# Patient Record
Sex: FEMALE | Race: BLACK OR AFRICAN AMERICAN | NOT HISPANIC OR LATINO | Employment: FULL TIME | URBAN - METROPOLITAN AREA
[De-identification: names, ages, dates, MRNs, and addresses within clinical notes are randomized per-mention and may not be internally consistent; named-entity substitution may affect disease eponyms.]

---

## 2020-08-19 ENCOUNTER — OFFICE VISIT (OUTPATIENT)
Dept: FAMILY MEDICINE CLINIC | Facility: CLINIC | Age: 30
End: 2020-08-19
Payer: COMMERCIAL

## 2020-08-19 VITALS
TEMPERATURE: 98.3 F | DIASTOLIC BLOOD PRESSURE: 70 MMHG | BODY MASS INDEX: 19.25 KG/M2 | WEIGHT: 127 LBS | HEIGHT: 68 IN | HEART RATE: 72 BPM | OXYGEN SATURATION: 98 % | SYSTOLIC BLOOD PRESSURE: 116 MMHG

## 2020-08-19 DIAGNOSIS — H53.9 VISUAL DISTURBANCE: ICD-10-CM

## 2020-08-19 DIAGNOSIS — R27.0 ATAXIA: ICD-10-CM

## 2020-08-19 DIAGNOSIS — R51.9 WORSENING HEADACHES: ICD-10-CM

## 2020-08-19 DIAGNOSIS — M54.2 CERVICALGIA: Primary | ICD-10-CM

## 2020-08-19 PROCEDURE — 1036F TOBACCO NON-USER: CPT | Performed by: FAMILY MEDICINE

## 2020-08-19 PROCEDURE — 3008F BODY MASS INDEX DOCD: CPT | Performed by: FAMILY MEDICINE

## 2020-08-19 PROCEDURE — 3725F SCREEN DEPRESSION PERFORMED: CPT | Performed by: FAMILY MEDICINE

## 2020-08-19 PROCEDURE — 99204 OFFICE O/P NEW MOD 45 MIN: CPT | Performed by: FAMILY MEDICINE

## 2020-08-19 RX ORDER — MULTIVITAMIN
1 TABLET ORAL DAILY
COMMUNITY

## 2020-08-19 RX ORDER — NORETHINDRONE ACETATE AND ETHINYL ESTRADIOL 1MG-20(21)
KIT ORAL
COMMUNITY
Start: 2020-03-24 | End: 2020-08-19 | Stop reason: ALTCHOICE

## 2020-08-19 RX ORDER — ACETAMINOPHEN, ASPIRIN AND CAFFEINE 250; 250; 65 MG/1; MG/1; MG/1
1 TABLET, FILM COATED ORAL EVERY 6 HOURS PRN
COMMUNITY

## 2020-08-19 RX ORDER — NAPROXEN 500 MG/1
500 TABLET ORAL 2 TIMES DAILY WITH MEALS
Qty: 60 TABLET | Refills: 1 | Status: SHIPPED | OUTPATIENT
Start: 2020-08-19

## 2020-08-19 RX ORDER — SUMATRIPTAN 25 MG/1
25 TABLET, FILM COATED ORAL ONCE AS NEEDED
Qty: 9 TABLET | Refills: 0 | Status: SHIPPED | OUTPATIENT
Start: 2020-08-19 | End: 2020-09-03

## 2020-08-20 NOTE — PROGRESS NOTES
Assessment/Plan:    68-year-old woman with: cervicalgia and worsening headaches along with visual changes and ataxia  Discussed workup and treatment options with risks and benefits  Discussed heat and cold stretching and anti-inflammatories will refer to physical therapy check MRI of the brain along with labs  Encouraged keeping a headache diary as well and vitamin B2 and magnesium 400 mg daily for prophylaxis  Follow-up in 2-3 weeks to discuss test results  No problem-specific Assessment & Plan notes found for this encounter  Diagnoses and all orders for this visit:    Cervicalgia  -     naproxen (NAPROSYN) 500 mg tablet; Take 1 tablet (500 mg total) by mouth 2 (two) times a day with meals  -     Ambulatory referral to Physical Therapy; Future  -     XR spine cervical 2 or 3 vw injury; Future  -     MRI brain w wo contrast; Future  -     CBC and differential; Future  -     Comprehensive metabolic panel; Future  -     TSH, 3rd generation with Free T4 reflex; Future  -     C-reactive protein; Future  -     SARAH Screen w/ Reflex to Titer/Pattern; Future  -     Prolactin; Future  -     Lyme Antibody Profile with reflex to WB; Future    Worsening headaches  -     MRI brain w wo contrast; Future  -     CBC and differential; Future  -     Comprehensive metabolic panel; Future  -     TSH, 3rd generation with Free T4 reflex; Future  -     C-reactive protein; Future  -     SARAH Screen w/ Reflex to Titer/Pattern; Future  -     Prolactin; Future  -     Lyme Antibody Profile with reflex to WB; Future  -     SUMAtriptan (IMITREX) 25 mg tablet; Take 1 tablet (25 mg total) by mouth once as needed for migraine for up to 1 dose    Visual disturbance  -     MRI brain w wo contrast; Future  -     CBC and differential; Future  -     Comprehensive metabolic panel; Future  -     TSH, 3rd generation with Free T4 reflex; Future  -     C-reactive protein; Future  -     SARAH Screen w/ Reflex to Titer/Pattern;  Future  - Prolactin; Future  -     Lyme Antibody Profile with reflex to WB; Future    Ataxia  -     MRI brain w wo contrast; Future  -     CBC and differential; Future  -     Comprehensive metabolic panel; Future  -     TSH, 3rd generation with Free T4 reflex; Future  -     C-reactive protein; Future  -     SARAH Screen w/ Reflex to Titer/Pattern; Future  -     Prolactin; Future  -     Lyme Antibody Profile with reflex to WB; Future    Other orders  -     Discontinue: norethindrone-ethinyl estradiol ([place] 1/20) 1-20 MG-MCG per tablet; take 1 tablet by mouth once daily  -     Multiple Vitamin (multivitamin) tablet; Take 1 tablet by mouth daily  -     aspirin-acetaminophen-caffeine (EXCEDRIN MIGRAINE) 530-013-46 MG per tablet; Take 1 tablet by mouth every 6 (six) hours as needed for headaches          Subjective:     Chief Complaint   Patient presents with   [address] patient here to establish care    Headache - Recurrent or Known Dx Migraines     Patient has had recurring headaches that have worsened in intensity after hx of a few falls off horse and being hit in face by horse and dog  Patient blacked out once in August from one of these falls  Patient has nausea and vomiting associated with headaches  Patient has never been evaluated for possible concussion  Patient has experienced balance issues after fall   Neck Pain     Patient has had left-sided neck pain since fall off horse in early August  Pain level is 8  Patient takes Excedrin with little help  Patient ID: Fannie Laureano is a 30 y o  female  Patient is a 17-year-old woman who presents to establish care in this practice  She admits cervicalgia and worsening headaches along with visual changes and ataxia he admits that she 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 no fevers chills nausea vomiting  Tolerating p o  Intake    No other complaints at this time      The following portions of the patient's history were reviewed and updated as appropriate: allergies, current medications, past family history, past medical history, past social history, past surgical history and problem list     Review of Systems   Constitutional: Negative  HENT: Negative  Eyes: Negative  Respiratory: Negative  Cardiovascular: Negative  Gastrointestinal: Negative  Endocrine: Negative  Genitourinary: Negative  Musculoskeletal: Positive for neck pain  Allergic/Immunologic: Negative  Neurological: Positive for headaches  Hematological: Negative  Psychiatric/Behavioral: Negative  All other systems reviewed and are negative  Objective:      /70 (BP Location: Right arm, Patient Position: Sitting, Cuff Size: Adult)   Pulse 72   Temp 98 3 °F (36 8 °C) (Tympanic)   Ht 5' 7 5" (1 715 m)   Wt 57 6 kg (127 lb)   LMP 07/19/2020 (Approximate)   SpO2 98%   BMI 19 60 kg/m²          Physical Exam  Constitutional:       Appearance: She is well-developed  HENT:      Head: Atraumatic  Right Ear: External ear normal       Left Ear: External ear normal    Eyes:      Conjunctiva/sclera: Conjunctivae normal       Pupils: Pupils are equal, round, and reactive to light  Neck:      Musculoskeletal: Normal range of motion  Cardiovascular:      Rate and Rhythm: Normal rate and regular rhythm  Heart sounds: Normal heart sounds  Pulmonary:      Effort: Pulmonary effort is normal  No respiratory distress  Breath sounds: Normal breath sounds  Abdominal:      General: Bowel sounds are normal  There is no distension  Palpations: Abdomen is soft  Tenderness: There is no abdominal tenderness  There is no guarding or rebound  Musculoskeletal: Normal range of motion  Skin:     General: Skin is warm and dry  Neurological:      Mental Status: She is alert and oriented to person, place, and time  Cranial Nerves: No cranial nerve deficit        Gait: Gait abnormal  Psychiatric:         Behavior: Behavior normal          Thought Content:  Thought content normal          Judgment: Judgment normal

## 2020-08-21 ENCOUNTER — HOSPITAL ENCOUNTER (OUTPATIENT)
Dept: MRI IMAGING | Facility: HOSPITAL | Age: 30
Discharge: HOME | End: 2020-08-21
Payer: COMMERCIAL

## 2020-08-21 DIAGNOSIS — R51.9 WORSENING HEADACHES: ICD-10-CM

## 2020-08-21 DIAGNOSIS — M54.2 CERVICALGIA: ICD-10-CM

## 2020-08-21 DIAGNOSIS — R27.0 ATAXIA: ICD-10-CM

## 2020-08-21 DIAGNOSIS — H53.9 VISUAL DISTURBANCE: ICD-10-CM

## 2020-08-21 PROCEDURE — A9585 GADOBUTROL INJECTION: HCPCS | Performed by: FAMILY MEDICINE

## 2020-08-21 PROCEDURE — 70553 MRI BRAIN STEM W/O & W/DYE: CPT

## 2020-08-21 PROCEDURE — G1004 CDSM NDSC: HCPCS

## 2020-08-21 RX ADMIN — GADOBUTROL 5 ML: 604.72 INJECTION INTRAVENOUS at 15:55

## 2020-08-26 ENCOUNTER — EVALUATION (OUTPATIENT)
Dept: PHYSICAL THERAPY | Facility: CLINIC | Age: 30
End: 2020-08-26
Payer: COMMERCIAL

## 2020-08-26 DIAGNOSIS — M54.2 CERVICALGIA: Primary | ICD-10-CM

## 2020-08-26 PROCEDURE — 97110 THERAPEUTIC EXERCISES: CPT

## 2020-08-26 PROCEDURE — 97161 PT EVAL LOW COMPLEX 20 MIN: CPT

## 2020-08-26 NOTE — PROGRESS NOTES
PT Evaluation     Today's date: 2020  Patient name: Devang Landry  : 1990  MRN: [id number]  Referring provider: Mike Fernandez MD  Dx:   Encounter Diagnosis     ICD-10-CM    1  Cervicalgia  M54 2 Ambulatory referral to Physical Therapy                  Assessment  Assessment details: Devang Landry is a 30 y o  female presents with likely cervicogenic HA, poor posture, cervical retraction mechanical bias  Devang Landry has the above listed impairments and will benefit from skilled PT to improve deficits to return to prior level of function  Devang Landry was educated on eval findings and plan for management, work station ergonomics, self SOR, cervical anatomy, need for erect posture and correct eyeglass Rx to help eliminate forward head posture  HEP initiated  [place] would benefit from skilled services to improve ROM, strength, flexibility, and function, and to decrease pain  Pt reporting pain "a lot better" to end eval and treatment today  Impairments: abnormal or restricted ROM, activity intolerance, impaired physical strength, lacks appropriate home exercise program, pain with function and poor posture   Understanding of Dx/Px/POC: good   Prognosis: good    Goals  2 wks  - No pain > 4/10  - Increase strength of deep neck flexor hold time to at least 30 sec  - Increase cervical ROM at least 10 deg where applicable    4-6 wks  - Pain 0/10  - Strength deep neck flexor hold time at least 45 sec  - Cervical ROM WFL and painfree  - Independent with HEP for self management  - Functional Status Measure at least 61 (score 42 at eval)  - 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, computer work, driving      Plan  Plan details: Pt opting to attend PT 1x/wk, limited frequency may prolong recovery  Pt given her Rx to obtain cervical x-rays, pt unaware she was issued Rx    Patient would benefit from: skilled physical therapy  Planned modality interventions: thermotherapy: hydrocollator packs  Planned therapy interventions: manual therapy, joint mobilization, neuromuscular re-education, postural training, patient education, stretching, strengthening, therapeutic activities, therapeutic exercise, flexibility and home exercise program  Frequency: 1x week  Duration in visits: 4  Duration in weeks: 4  Treatment plan discussed with: patient        Subjective Evaluation    History of Present Illness  Mechanism of injury: Pt reporting over the past 2 months pt fell off horse, got struck in the face by horse, and got struck in face by dog  Pt notes PMH migraines, "now my neck has been ridiculously stiff, and now I have this pain on the upper left side of my neck and it radiates up into my head, that's where my headache comes from "  Pt notes migraine HA historically frontal, whereas new onset HA from L upper cervical/subocciptial area  Pt denies having UE sx, dizziness  Pt notes when she fell off the horse she fell to left, horse and dog struck her on left side of face  Functional limitations: stiffness with horseback riding, computer work, driving   "I used to be able to crack my own neck to get relief but I'm not able to do that anymore" prior to current issue  Pt was given Rx for cervical x-rays, was not aware, yet to obtain films    PT issued pt her [facility] Rx [facility]    Pt wears reading glasses, "I should probably get a new prescription "  Pain  Current pain ratin  At best pain ratin  At worst pain ratin  Location: L upper cervical/suboccipital stiffness  Quality: dull ache  Relieving factors: medications    Social Support  Steps to enter house: no  Stairs in house: no   Lives in: [place]  Lives with: significant other    Employment status: working (, computer work)  Hand dominance: right      Diagnostic Tests  No diagnostic tests performed  Abnormal x-ray: Pt given Rx for cervical x-rays by PCP, pt not aware and yet to obtain films   Instructed to do so by PT  Treatments  Current treatment: medication  Patient Goals  Patient goals for therapy: decreased pain, increased motion, increased strength, independence with ADLs/IADLs and return to sport/leisure activities  Patient goal: Horseback riding        Objective    Posture: forward head, increased thoracic kyphosis and lumbar lordosis  Slouched sitting posture  OH reach: B 163 deg, mild IAD scapula  Cervical ROM: flexion 57 deg, no effect (NE)  Repeated flexion with decreased L upper cervical pain, better  Ext 44 deg, L upper cervical pain  Repeated ext with L upper cervical pain, slightly worse  SBR 30 deg, L cervical stretch  SBL 25 deg, R cervical stiffness  R rotation 57 deg, L lower cervical stiffness  L rotation 53 deg, R lower cervical stiffness  Short sitting cervical retraction, repeated retraction pain free, slightly better  B shoulder A/PROM: WNL grossly assessed  MMT:  Deep neck flexor hold time 15 sec prior to failure  MMT B mid trap, lower trap 5/5  Special tests: alar and transverse ligament testing, vertebral artery testing (-)  Suboccipital release decreases pain  Palpation: L subocc mm TTP, chief complaint    Joint mobilizations: B UPA C1 , C2 painful, chief complaint  Otherwise B UPA, PA C1-C7 with no effect        Flowsheet Rows      Most Recent Value   PT/OT G-Codes   Current Score  42   Projected Score  61             Precautions: none      Manuals 8/26/ 20            SOR 3 min (self HEP)            Man str R UT, LS R UT 20"x5            R U/L flexion mob AO jt G3                         Neuro Re-Ed             Chin tuck short sit, H/L x10 ea            Scap retract 10"x15                                                                             Ther Ex             Self R UT str 20"x5            Doorway pec str 20"x5            UBE             Core row             Wall slide F B Ther Activity                                       Gait Training                                       Modalities

## 2020-09-02 ENCOUNTER — OFFICE VISIT (OUTPATIENT)
Dept: PHYSICAL THERAPY | Facility: CLINIC | Age: 30
End: 2020-09-02
Payer: COMMERCIAL

## 2020-09-02 DIAGNOSIS — M54.2 CERVICALGIA: Primary | ICD-10-CM

## 2020-09-02 PROCEDURE — 97140 MANUAL THERAPY 1/> REGIONS: CPT

## 2020-09-02 PROCEDURE — 97110 THERAPEUTIC EXERCISES: CPT

## 2020-09-02 NOTE — PROGRESS NOTES
Daily Note     Today's date: 2020  Patient name: Evan Bello  : 1990  MRN: [id number]  Referring provider: Billy Mathias MD  Dx:   Encounter Diagnosis     ICD-10-CM    1  Cervicalgia  M54 2                   Subjective: "Good "  Neck pain 0/10  Objective: See treatment diary below      Assessment: Tolerated treatment well  Patient would benefit from continued PT  Currently pain free, HEP progressed  Plan: Continue per plan of care        Precautions: none      Manuals  9           SOR 3 min (self HEP) 3'           Man str R UT, LS L UT 20"x5 L UT, LS x5           R U/L flexion mob AO jt G3 G3           Cerv retract mob  G3           Neuro Re-Ed             Chin tuck short sit, H/L x10 ea x10 ea           Scap retract 10"x15                                                                             Ther Ex             Self L UT str 20"x5 x5           Doorway pec str 20"x5 x5           UBE  5'           Core row  blk 2x10           Wall slide F B  10"x 15 B           DNF in H/L  10"x 10                                                                                                                   Modalities

## 2020-09-03 DIAGNOSIS — R51.9 WORSENING HEADACHES: ICD-10-CM

## 2020-09-03 RX ORDER — SUMATRIPTAN 25 MG/1
TABLET, FILM COATED ORAL
Qty: 9 TABLET | Refills: 0 | Status: SHIPPED | OUTPATIENT
Start: 2020-09-03

## 2020-09-09 ENCOUNTER — OFFICE VISIT (OUTPATIENT)
Dept: PHYSICAL THERAPY | Facility: CLINIC | Age: 30
End: 2020-09-09
Payer: COMMERCIAL

## 2020-09-09 DIAGNOSIS — M54.2 CERVICALGIA: Primary | ICD-10-CM

## 2020-09-09 PROCEDURE — 97140 MANUAL THERAPY 1/> REGIONS: CPT

## 2020-09-09 PROCEDURE — 97110 THERAPEUTIC EXERCISES: CPT

## 2020-09-09 NOTE — PROGRESS NOTES
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 " Pt denies neck pain 0/10 at arrival  Notes she feels her sx have been improving since starting therapy  Objective: See treatment diary below      Assessment: Tolerated treatment well  Patient would benefit from continued PT For improved strength, flexibility and overall function  L shoulder F more restricted than R during wall slides  Good form with all exercises  Reviewed postural awareness and ice/heat for pain modulation if necessary  Plan: Continue per plan of care        Precautions: none      Manuals           SOR 3 min (self HEP) 3' 3'          Man str R UT, LS L UT 20"x5 L UT, LS x5 x5          R U/L flexion mob AO jt G3 G3 G3 HJ          Cerv retract mob  G3 G3 HJ          Neuro Re-Ed             Chin tuck short sit, H/L x10 ea x10 ea x10 ea          Scap retract 10"x15                                                                             Ther Ex             Self L UT str 20"x5 x5 x5          Doorway pec str 20"x5 x5 x5          UBE  5' 7'          Core row  blk 2x10 x25          Wall slide F B  10"x 15 B x20           DNF in H/L  10"x 10 x10                                                                                                                   Modalities

## 2020-09-16 ENCOUNTER — APPOINTMENT (OUTPATIENT)
Dept: PHYSICAL THERAPY | Facility: CLINIC | Age: 30
End: 2020-09-16
Payer: COMMERCIAL

## 2020-09-23 ENCOUNTER — APPOINTMENT (OUTPATIENT)
Dept: PHYSICAL THERAPY | Facility: CLINIC | Age: 30
End: 2020-09-23
Payer: COMMERCIAL

## 2021-04-13 DIAGNOSIS — Z23 ENCOUNTER FOR IMMUNIZATION: ICD-10-CM
